# Patient Record
Sex: FEMALE | Race: WHITE
[De-identification: names, ages, dates, MRNs, and addresses within clinical notes are randomized per-mention and may not be internally consistent; named-entity substitution may affect disease eponyms.]

---

## 2018-01-09 ENCOUNTER — HOSPITAL ENCOUNTER (EMERGENCY)
Dept: HOSPITAL 76 - ED | Age: 18
Discharge: HOME | End: 2018-01-09
Payer: COMMERCIAL

## 2018-01-09 VITALS — SYSTOLIC BLOOD PRESSURE: 108 MMHG | DIASTOLIC BLOOD PRESSURE: 70 MMHG

## 2018-01-09 DIAGNOSIS — S05.01XA: Primary | ICD-10-CM
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
## 2018-01-09 PROCEDURE — 99283 EMERGENCY DEPT VISIT LOW MDM: CPT
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
## 2019-01-22 ENCOUNTER — HOSPITAL ENCOUNTER (EMERGENCY)
Dept: HOSPITAL 76 - ED | Age: 19
Discharge: HOME | End: 2019-01-22
Payer: COMMERCIAL

## 2019-01-22 VITALS — SYSTOLIC BLOOD PRESSURE: 136 MMHG | DIASTOLIC BLOOD PRESSURE: 86 MMHG

## 2019-01-22 DIAGNOSIS — R03.0: ICD-10-CM

## 2019-01-22 DIAGNOSIS — H61.23: ICD-10-CM

## 2019-01-22 DIAGNOSIS — Z04.1: Primary | ICD-10-CM

## 2019-01-22 PROCEDURE — 99283 EMERGENCY DEPT VISIT LOW MDM: CPT

## 2019-01-22 PROCEDURE — 69209 REMOVE IMPACTED EAR WAX UNI: CPT
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
## 2019-02-09 ENCOUNTER — HOSPITAL ENCOUNTER (EMERGENCY)
Dept: HOSPITAL 76 - ED | Age: 19
Discharge: HOME | End: 2019-02-09
Payer: COMMERCIAL

## 2019-02-09 VITALS — SYSTOLIC BLOOD PRESSURE: 128 MMHG | DIASTOLIC BLOOD PRESSURE: 91 MMHG

## 2019-02-09 DIAGNOSIS — N30.90: Primary | ICD-10-CM

## 2019-02-09 DIAGNOSIS — R03.0: ICD-10-CM

## 2019-02-09 LAB
CLARITY UR REFRACT.AUTO: CLEAR
GLUCOSE UR QL STRIP.AUTO: NEGATIVE MG/DL
HCG UR QL: NEGATIVE
KETONES UR QL STRIP.AUTO: NEGATIVE MG/DL
NITRITE UR QL STRIP.AUTO: NEGATIVE
PH UR STRIP.AUTO: 5.5 PH (ref 5–7.5)
PROT UR STRIP.AUTO-MCNC: NEGATIVE MG/DL
RBC # UR STRIP.AUTO: (no result) /UL
RBC # URNS HPF: (no result) /HPF (ref 0–5)
SP GR UR STRIP.AUTO: 1.01 (ref 1–1.03)
SQUAMOUS URNS QL MICRO: (no result)
UROBILINOGEN UR QL STRIP.AUTO: (no result) E.U./DL
UROBILINOGEN UR STRIP.AUTO-MCNC: NEGATIVE MG/DL

## 2019-02-09 PROCEDURE — 81025 URINE PREGNANCY TEST: CPT

## 2019-02-09 PROCEDURE — 87086 URINE CULTURE/COLONY COUNT: CPT

## 2019-02-09 PROCEDURE — 81001 URINALYSIS AUTO W/SCOPE: CPT

## 2019-02-09 PROCEDURE — 99283 EMERGENCY DEPT VISIT LOW MDM: CPT

## 2019-02-09 PROCEDURE — 81003 URINALYSIS AUTO W/O SCOPE: CPT

## 2019-02-09 NOTE — ED PHYSICIAN DOCUMENTATION
PD HPI FEMALE 





- Stated complaint


Stated Complaint: FEMALE 





- Chief complaint


Chief Complaint: UTI





- History obtained from


History obtained from: Patient





- History of Present Illness


Timing - onset: Today (Urinary frequency and burning starting today without 

flank pain fevers or chills.  Used to have frequent UTIs but had a ureteral 

valve fixed and since then as a child has not had any UTIs.)





Review of Systems


Constitutional: denies: Fever, Chills


Respiratory: denies: Dyspnea, Cough


GI: denies: Abdominal Pain, Nausea, Vomiting





PD PAST MEDICAL HISTORY





- Past Medical History


Cardiovascular: None


Respiratory: None


Neuro: None


Endocrine/Autoimmune: None


GI: None


GYN: None


: None


HEENT: None


Psych: None


Musculoskeletal: None


Derm: None





- Past Surgical History


Past Surgical History: No





- Present Medications


Home Medications: 


                                Ambulatory Orders











 Medication  Instructions  Recorded  Confirmed


 


Nitrofurantoin Monohyd/M-Cryst 100 mg PO BID #10 capsule 02/09/19 





[Macrobid 100 mg Capsule]   


 


Phenazopyridine HCl [Pyridium] 200 mg PO TID PRN #6 tablet 02/09/19 














- Allergies


Allergies/Adverse Reactions: 


                                    Allergies











Allergy/AdvReac Type Severity Reaction Status Date / Time


 


No Known Drug Allergies Allergy   Verified 02/09/19 17:42














- Social History


Does the pt smoke?: No


Smoking Status: Never smoker


Does the pt drink ETOH?: No


Does the pt have substance abuse?: Yes





- Immunizations


Immunizations are current?: Yes





- POLST


Patient has POLST: No





PD ED PE NORMAL





- Vitals


Vital signs reviewed: Yes





- General


General: Alert and oriented X 3, No acute distress





- Abdomen


Abdomen: Soft, Non tender





- Back


Back: No CVA TTP, No spinal TTP





- Neuro


Neuro: Alert and oriented X 3, Normal speech





Results





- Vitals


Vitals: 


                               Vital Signs - 24 hr











  02/09/19





  17:40


 


Temperature 36.4 C L


 


Heart Rate 92


 


Respiratory 16





Rate 


 


Blood Pressure 144/90 H


 


O2 Saturation 100








                                     Oxygen











O2 Source                      Room air

















- Labs


Labs: 


                                Laboratory Tests











  02/09/19





  18:05


 


Urine Color  YELLOW


 


Urine Clarity  CLEAR


 


Urine pH  5.5


 


Ur Specific Gravity  1.010


 


Urine Protein  NEGATIVE


 


Urine Glucose (UA)  NEGATIVE


 


Urine Ketones  NEGATIVE


 


Urine Occult Blood  SMALL H


 


Urine Nitrite  NEGATIVE


 


Urine Bilirubin  NEGATIVE


 


Urine Urobilinogen  0.2 (NORMAL)


 


Ur Leukocyte Esterase  SMALL H


 


Urine RBC  0-5


 


Urine WBC  >25 H


 


Ur Squamous Epith Cells  FEW Squamous


 


Urine Bacteria  None Seen


 


Ur Microscopic Review  INDICATED


 


Urine Culture Comments  INDICATED


 


Urine HCG, Qual  NEGATIVE














Departure





- Departure


Disposition: 01 Home, Self Care


Clinical Impression: 


 Cystitis





Condition: Good


Record reviewed to determine appropriate education?: Yes


Instructions:  ED UTI Cystitis Female


Prescriptions: 


Nitrofurantoin Monohyd/M-Cryst [Macrobid 100 mg Capsule] 100 mg PO BID #10 

capsule


Phenazopyridine HCl [Pyridium] 200 mg PO TID PRN #6 tablet


 PRN Reason: dysuria


Comments: 


We will culture your urine, the results should be done in 48-72 hours.  If an 

antibiotic change is necessary we will call you.  Return if worse in the mean

time, especially if you develop increasing flank pain, fevers, or cannot keep 

down the medication.





Your blood pressure was elevated today on check into the emergency department.  

This does not mean that you have hypertension, it is a common phenomenon to come

 to the emergency department and have elevated blood pressure.  I recommend that

 you see your primary care physician within the week to have it rechecked when 

you are feeling better.

## 2023-03-01 ENCOUNTER — HOSPITAL ENCOUNTER (EMERGENCY)
Dept: HOSPITAL 76 - ED | Age: 23
Discharge: HOME | End: 2023-03-01
Payer: COMMERCIAL

## 2023-03-01 VITALS — DIASTOLIC BLOOD PRESSURE: 103 MMHG | SYSTOLIC BLOOD PRESSURE: 150 MMHG

## 2023-03-01 DIAGNOSIS — N30.00: Primary | ICD-10-CM

## 2023-03-01 LAB
CLARITY UR REFRACT.AUTO: (no result)
GLUCOSE UR QL STRIP.AUTO: NEGATIVE MG/DL
HCG UR QL: NEGATIVE
KETONES UR QL STRIP.AUTO: NEGATIVE MG/DL
NITRITE UR QL STRIP.AUTO: NEGATIVE
PH UR STRIP.AUTO: 6 PH (ref 5–7.5)
PROT UR STRIP.AUTO-MCNC: 30 MG/DL
RBC # UR STRIP.AUTO: (no result) /UL
RBC # URNS HPF: (no result) /HPF (ref 0–5)
SP GR UR STRIP.AUTO: 1.02 (ref 1–1.03)
SQUAMOUS URNS QL MICRO: (no result)
UROBILINOGEN UR QL STRIP.AUTO: (no result) E.U./DL
UROBILINOGEN UR STRIP.AUTO-MCNC: NEGATIVE MG/DL
WBC # UR MANUAL: >25 /HPF (ref 0–5)

## 2023-03-01 PROCEDURE — 87086 URINE CULTURE/COLONY COUNT: CPT

## 2023-03-01 PROCEDURE — 81001 URINALYSIS AUTO W/SCOPE: CPT

## 2023-03-01 PROCEDURE — 99283 EMERGENCY DEPT VISIT LOW MDM: CPT

## 2023-03-01 PROCEDURE — 87077 CULTURE AEROBIC IDENTIFY: CPT

## 2023-03-01 PROCEDURE — 87181 SC STD AGAR DILUTION PER AGT: CPT

## 2023-03-01 PROCEDURE — 81025 URINE PREGNANCY TEST: CPT

## 2023-03-01 PROCEDURE — 81003 URINALYSIS AUTO W/O SCOPE: CPT

## 2023-03-01 RX ADMIN — CEFPODOXIME PROXETIL STA MG: 100 TABLET, FILM COATED ORAL at 21:16

## 2023-03-01 RX ADMIN — PHENAZOPYRIDINE STA MG: 100 TABLET ORAL at 21:17

## 2023-03-01 NOTE — ED PHYSICIAN DOCUMENTATION
History of Present Illness





- Stated complaint


Stated Complaint: FEMALE 





- Chief complaint


Chief Complaint: UTI





- Additonal information


Additional information: 





22-year-old female presents emergency department for evaluation of acute onset d

ysuria urgency and frequency.  Symptoms began last night.  Feels similar to UTIs

she has had in the past though none now for at least 3 years.  She is sexually 

active with a new partner but reports 100% condom use.  No vaginal discharge.  

No pelvic pain.  She has been drinking cranberry juice without relief of 

symptoms.  No fevers vomiting or flank pain





Patient is reliable historian





Review of Systems


Constitutional: reports: Reviewed and negative


GI: denies: Abdominal Pain, Nausea, Vomiting


: reports: Dysuria, Frequency.  denies: Hematuria, Discharge


Skin: reports: Reviewed and negative





PD PAST MEDICAL HISTORY





- Past Medical History


Past Medical History: Yes


Cardiovascular: None


Respiratory: None


Neuro: None


Endocrine/Autoimmune: None


GI: None


GYN: None


: Other


HEENT: None


Psych: None


Musculoskeletal: None


Derm: None


Other Past Medical History: UTI; Urethral Malformation





- Past Surgical History


Past Surgical History: Yes





- Present Medications


Home Medications: 


                                Ambulatory Orders











 Medication  Instructions  Recorded  Confirmed


 


Cefpodoxime Proxetil [Vantin] 100 mg PO Q12H #14 tablet 03/01/23 


 


Norethindrone-Ethin. Estradiol 1 tab PO DAILY 03/01/23 03/01/23





[Nortrel 1-35 28 Tablet]   


 


Phenazopyridine HCl [Pyridium] 200 mg PO TID PRN #6 tablet 03/01/23 














- Allergies


Allergies/Adverse Reactions: 


                                    Allergies











Allergy/AdvReac Type Severity Reaction Status Date / Time


 


No Known Drug Allergies Allergy   Verified 03/01/23 19:43














- Social History


Does the pt smoke?: No


Smoking Status: Never smoker


Does the pt drink ETOH?: No


Does the pt have substance abuse?: Yes





- Immunizations


Immunizations are current?: Yes





- POLST


Patient has POLST: No





PD ED PE NORMAL





- General


General: Alert and oriented X 3, No acute distress





- Cardiac


Cardiac: RRR, No murmur





- Respiratory


Respiratory: No respiratory distress, Clear bilaterally





- Abdomen


Abdomen: Normal bowel sounds, Soft.  No: Non tender (Mild suprapubic tenderness.

 No flank pain CVA tenderness.  No guarding or rebound)





- Back


Back: No CVA TTP, No spinal TTP





- Derm


Derm: Normal color, Warm and dry, No rash





- Extremities


Extremities: No deformity, No tenderness to palpate, Normal ROM s pain





- Neuro


Neuro: Alert and oriented X 3, CNs 2-12 intact


Eye Opening: Spontaneous


Motor: Obeys Commands


Verbal: Oriented


GCS Score: 15





Results





- Vitals


Vitals: 





                               Vital Signs - 24 hr











  03/01/23 03/01/23





  19:30 21:19


 


Temperature 36.6 C 36.8 C


 


Heart Rate 94 108 H


 


Respiratory 18 16





Rate  


 


Blood Pressure 168/116 H 148/107 H


 


O2 Saturation 98 98








                                     Oxygen











O2 Source                      Room air

















- Labs


Labs: 





                                Laboratory Tests











  03/01/23





  19:42


 


Urine Color  YELLOW


 


Urine Clarity  HAZY


 


Urine pH  6.0


 


Ur Specific Gravity  1.020


 


Urine Protein  30 H


 


Urine Glucose (UA)  NEGATIVE


 


Urine Ketones  NEGATIVE


 


Urine Occult Blood  LARGE H


 


Urine Nitrite  NEGATIVE


 


Urine Bilirubin  NEGATIVE


 


Urine Urobilinogen  0.2 (NORMAL)


 


Ur Leukocyte Esterase  LARGE H


 


Urine RBC  TNTC H


 


Urine WBC  >25 H


 


Ur Squamous Epith Cells  RARE Squamous


 


Urine Bacteria  Few


 


Ur Microscopic Review  INDICATED


 


Urine Culture Comments  INDICATED


 


Urine HCG, Qual  NEGATIVE














PD Medical Decision Making





- ED course


Complexity details: reviewed results, re-evaluated patient, considered 

differential, d/w patient


ED course: 





22-year-old female presents emergency department for evaluation of dysuria 

urgency and frequency.  Symptoms began last night.  Urinalysis is consistent 

with infection.  She has no fevers flank pain or vomiting.  I have lower 

suspicion for a sending infection.  Patient was administered 1 dose of Vantin 

here in the ER as well as Pyridium.  She will did discharged with oral 

prescription for both.  We discussed the usual emergent return precautions for 

worsening symptoms





Departure





- Departure


Disposition: 01 Home, Self Care


Clinical Impression: 


Acute cystitis


Qualifiers:


 Hematuria presence: without hematuria Qualified Code(s): N30.00 - Acute 

cystitis without hematuria





Condition: Stable


Record reviewed to determine appropriate education?: Yes


Instructions:  ED UTI Cystitis Female


Prescriptions: 


Phenazopyridine HCl [Pyridium] 200 mg PO TID PRN #6 tablet


 PRN Reason: dysuria


Cefpodoxime Proxetil [Vantin] 100 mg PO Q12H #14 tablet


Comments: 


Sera you came to the emergency department with urinary symptoms.  You do have a

 urinary tract infection.  You were given your initial dose of antibiotics here 

in the emergency department.  A prescription for cefpodoxime also known as 

Vantin as well as Pyridium have been sent to the [facility] in [place].





With the antibiotics I would expect improved symptoms over the next 48 to 72 

hours.  If her symptoms or not improving, you develop fevers, have flank pain or

 vomiting you should return immediately to any emergency department for 

reevaluation